# Patient Record
Sex: FEMALE | Race: BLACK OR AFRICAN AMERICAN | HISPANIC OR LATINO | Employment: FULL TIME | URBAN - METROPOLITAN AREA
[De-identification: names, ages, dates, MRNs, and addresses within clinical notes are randomized per-mention and may not be internally consistent; named-entity substitution may affect disease eponyms.]

---

## 2018-01-10 NOTE — MISCELLANEOUS
Provider Comments  Provider Comments:   PT NO SHOW FOR DEPO CALLED PT L/M ALSO SENDING LETTER      Signatures   Electronically signed by : Cyrus Hernandez, ; Apr 7 2016  9:27AM EST                       (Author)    Electronically signed by : BABAK Pollock;  Apr 11 2016  3:57PM EST                       (Acknowledgement)    Electronically signed by : Abel Perry MD; Apr 21 2016  8:39AM EST

## 2018-01-11 NOTE — PROGRESS NOTES
Chief Complaint  Depo today  Active Problems    1  Birth control (V25 9) (Z30 9)   2  Birth control counseling (V25 09) (Z30 9)   3  Encounter for initial prescription of injectable contraceptive (V25 02) (Z30 013)   4  Visit for gynecologic examination (V72 31) (Z01 419)    Current Meds   1  Depo-Provera 150 MG/ML Intramuscular Suspension; Therapy: [id number] to Recorded    Allergies    1  No Known Drug Allergies    2  No Known Environmental Allergies   3  No Known Food Allergies    Vitals  Signs [Data Includes: Current Encounter]    Systolic: 227  Diastolic: 73  Weight: 772 lb     Plan  Birth control    · MedroxyPROGESTERone Acetate 150 MG/ML Intramuscular Suspension    Future Appointments    Date/Time Provider Specialty Site   07/06/2016 03:00 PM Karen Short 171, Injection Schedule  [facility] [place]     Signatures   Electronically signed by :  BABAK Hill; May  2 2016  4:05PM EST                       (Acknowledgement)    Electronically signed by : Radha Anaya MD; May  5 2016  9:28AM EST

## 2018-01-13 NOTE — MISCELLANEOUS
Provider Comments  Provider Comments:   pt no show for depo called pt and also send letter      Signatures   Electronically signed by : Eloise Bustillo, ; Jan 18 2016 10:58AM EST                       (Author)    Electronically signed by :  BABAK Minor; Jan 25 2016  1:43PM EST                       (Acknowledgement)

## 2019-03-21 ENCOUNTER — OFFICE VISIT (OUTPATIENT)
Dept: OBGYN CLINIC | Facility: CLINIC | Age: 24
End: 2019-03-21

## 2019-03-21 VITALS
WEIGHT: 130.2 LBS | SYSTOLIC BLOOD PRESSURE: 129 MMHG | BODY MASS INDEX: 21.69 KG/M2 | HEIGHT: 65 IN | HEART RATE: 75 BPM | DIASTOLIC BLOOD PRESSURE: 83 MMHG

## 2019-03-21 DIAGNOSIS — A64 STD (SEXUALLY TRANSMITTED DISEASE): ICD-10-CM

## 2019-03-21 DIAGNOSIS — Z01.419 ENCOUNTER FOR ANNUAL ROUTINE GYNECOLOGICAL EXAMINATION: Primary | ICD-10-CM

## 2019-03-21 DIAGNOSIS — Z72.51 HIGH RISK HETEROSEXUAL BEHAVIOR: ICD-10-CM

## 2019-03-21 DIAGNOSIS — Z30.42 ENCOUNTER FOR SURVEILLANCE OF INJECTABLE CONTRACEPTIVE: ICD-10-CM

## 2019-03-21 PROCEDURE — S0610 ANNUAL GYNECOLOGICAL EXAMINA: HCPCS | Performed by: NURSE PRACTITIONER

## 2019-03-21 PROCEDURE — 87591 N.GONORRHOEAE DNA AMP PROB: CPT | Performed by: NURSE PRACTITIONER

## 2019-03-21 PROCEDURE — G0143 SCR C/V CYTO,THINLAYER,RESCR: HCPCS | Performed by: NURSE PRACTITIONER

## 2019-03-21 PROCEDURE — 87491 CHLMYD TRACH DNA AMP PROBE: CPT | Performed by: NURSE PRACTITIONER

## 2019-03-21 RX ORDER — MEDROXYPROGESTERONE ACETATE 150 MG/ML
150 INJECTION, SUSPENSION INTRAMUSCULAR
Qty: 1 ML | Refills: 5 | Status: SHIPPED | OUTPATIENT
Start: 2019-03-21

## 2019-03-21 NOTE — PATIENT INSTRUCTIONS
Return in 2 weeks Depoprovera restart  PAP and STD results will be available in 2 weeks  Call with needs or concerns

## 2019-03-21 NOTE — PROGRESS NOTES
Annual Exam    Assessment   1  Encounter for annual routine gynecological examination  Liquid-based pap, screening   2  Encounter for surveillance of injectable contraceptive  medroxyPROGESTERone (DEPO-PROVERA) 150 mg/mL injection   3  High risk heterosexual behavior  Chlamydia/GC amplified DNA by PCR   4  STD (sexually transmitted disease)  Chlamydia/GC amplified DNA by PCR     well woman       Plan       All questions answered  Breast self exam technique reviewed and patient encouraged to perform self-exam monthly  Chlamydia specimen  Contraception: Depo-Provera injections  Discussed healthy lifestyle modifications  Follow up in 1 year  GC specimen  Thin prep Pap smear  Patient Instructions   Return in 2 weeks Depoprovera restart  PAP and STD results will be available in 2 weeks  Call with needs or concerns      Khanh Browne is a 24 y o   female who presents for annual well woman exam  Periods are irregular, lasting on and off since last Depo in October days  No vaginal  Discharge  1 partner x 1 year  Wants Depoprovera restart, last unprotected intercourse 2 days ago    Current contraception: Depo-Provera injections  History of abnormal Pap smear: no  Family history of uterine or ovarian cancer: no  Regular self breast exam: yes  History of abnormal mammogram: N/A  Family history of breast cancer: no  History of abnormal lipids: Unknown  Menstrual History:  OB History        1    Para        Term                AB   1    Living           SAB   1    TAB        Ectopic        Multiple        Live Births                    Menarche age: 14  Patient's last menstrual period was 2019 (exact date)    Period Duration (Days): 3-4 days   Period Pattern: Regular  Menstrual Flow: Light    The following portions of the patient's history were reviewed and updated as appropriate: allergies, current medications, past family history, past medical history, past social history, past surgical history and problem list     Review of Systems  Pertinent items are noted in HPI        Objective      /83   Pulse 75   Ht 5' 5" (1 651 m)   Wt 59 1 kg (130 lb 3 2 oz)   LMP 02/04/2019 (Exact Date)   BMI 21 67 kg/m²     General:   alert and oriented, in no acute distress, alert, appears stated age and cooperative   Heart: regular rate and rhythm, S1, S2 normal, no murmur, click, rub or gallop   Lungs: clear to auscultation bilaterally   Thyroid: Negative masses   Abdomen: soft, non-tender, without masses or organomegaly   Vulva: normal   Vagina: normal mucosa   Cervix: no cervical motion tenderness and no lesions   Uterus: normal size, non-tender, normal shape and consistency   Adnexa: normal adnexa   Breasts: NT, negative masses, discharge or dimpling, bilateral Nipple piercing

## 2019-03-22 LAB
C TRACH DNA SPEC QL NAA+PROBE: NEGATIVE
N GONORRHOEA DNA SPEC QL NAA+PROBE: NEGATIVE

## 2019-03-26 LAB
LAB AP GYN PRIMARY INTERPRETATION: NORMAL
Lab: NORMAL

## 2019-04-03 ENCOUNTER — CLINICAL SUPPORT (OUTPATIENT)
Dept: OBGYN CLINIC | Facility: CLINIC | Age: 24
End: 2019-04-03

## 2019-04-03 DIAGNOSIS — Z30.42 ENCOUNTER FOR SURVEILLANCE OF INJECTABLE CONTRACEPTIVE: Primary | ICD-10-CM

## 2019-04-03 LAB — SL AMB POCT URINE HCG: NEGATIVE

## 2019-04-03 PROCEDURE — 81025 URINE PREGNANCY TEST: CPT

## 2019-04-03 PROCEDURE — 96372 THER/PROPH/DIAG INJ SC/IM: CPT

## 2019-04-03 RX ORDER — MEDROXYPROGESTERONE ACETATE 150 MG/ML
150 INJECTION, SUSPENSION INTRAMUSCULAR ONCE
Status: COMPLETED | OUTPATIENT
Start: 2019-04-03 | End: 2019-04-03

## 2019-04-03 RX ADMIN — MEDROXYPROGESTERONE ACETATE 150 MG: 150 INJECTION, SUSPENSION INTRAMUSCULAR at 14:00

## 2019-06-24 ENCOUNTER — CLINICAL SUPPORT (OUTPATIENT)
Dept: OBGYN CLINIC | Facility: CLINIC | Age: 24
End: 2019-06-24

## 2019-06-24 DIAGNOSIS — Z30.42 ENCOUNTER FOR SURVEILLANCE OF INJECTABLE CONTRACEPTIVE: Primary | ICD-10-CM

## 2019-06-24 LAB — SL AMB POCT URINE HCG: NEGATIVE

## 2019-06-24 PROCEDURE — 81025 URINE PREGNANCY TEST: CPT

## 2019-06-24 PROCEDURE — 96372 THER/PROPH/DIAG INJ SC/IM: CPT

## 2019-06-24 RX ORDER — MEDROXYPROGESTERONE ACETATE 150 MG/ML
150 INJECTION, SUSPENSION INTRAMUSCULAR ONCE
Status: COMPLETED | OUTPATIENT
Start: 2019-06-24 | End: 2019-06-24

## 2019-06-24 RX ADMIN — MEDROXYPROGESTERONE ACETATE 150 MG: 150 INJECTION, SUSPENSION INTRAMUSCULAR at 13:20

## 2019-09-17 ENCOUNTER — OFFICE VISIT (OUTPATIENT)
Dept: OBGYN CLINIC | Facility: CLINIC | Age: 24
End: 2019-09-17

## 2019-09-17 VITALS
BODY MASS INDEX: 23.66 KG/M2 | HEART RATE: 87 BPM | DIASTOLIC BLOOD PRESSURE: 80 MMHG | WEIGHT: 142.2 LBS | SYSTOLIC BLOOD PRESSURE: 120 MMHG

## 2019-09-17 DIAGNOSIS — N93.9 ABNORMAL UTERINE BLEEDING (AUB): ICD-10-CM

## 2019-09-17 DIAGNOSIS — Z72.51 HIGH RISK HETEROSEXUAL BEHAVIOR: ICD-10-CM

## 2019-09-17 DIAGNOSIS — Z30.42 ENCOUNTER FOR SURVEILLANCE OF INJECTABLE CONTRACEPTIVE: Primary | ICD-10-CM

## 2019-09-17 LAB — SL AMB POCT URINE HCG: NEGATIVE

## 2019-09-17 PROCEDURE — 96372 THER/PROPH/DIAG INJ SC/IM: CPT | Performed by: OBSTETRICS & GYNECOLOGY

## 2019-09-17 PROCEDURE — 81025 URINE PREGNANCY TEST: CPT | Performed by: OBSTETRICS & GYNECOLOGY

## 2019-09-17 PROCEDURE — 99213 OFFICE O/P EST LOW 20 MIN: CPT | Performed by: OBSTETRICS & GYNECOLOGY

## 2019-09-17 RX ORDER — MEDROXYPROGESTERONE ACETATE 150 MG/ML
150 INJECTION, SUSPENSION INTRAMUSCULAR ONCE
Status: CANCELLED | OUTPATIENT
Start: 2019-09-17 | End: 2019-09-17

## 2019-09-17 RX ORDER — MEDROXYPROGESTERONE ACETATE 150 MG/ML
150 INJECTION, SUSPENSION INTRAMUSCULAR ONCE
Status: COMPLETED | OUTPATIENT
Start: 2019-09-17 | End: 2019-09-17

## 2019-09-17 RX ADMIN — MEDROXYPROGESTERONE ACETATE 150 MG: 150 INJECTION, SUSPENSION INTRAMUSCULAR at 12:40

## 2019-09-17 NOTE — PROGRESS NOTES
Depo-Provera      Patient provided box   Mee Reg Last Depo date: 6/24   Side effects:  None   HCG: Negative   Given by: BOBO De La Rosa   Site: Right buttock   Calcium supplement daily teaching, condoms for 2 weeks following first injection dose

## 2019-09-17 NOTE — PROGRESS NOTES
ASSESMENT & PLAN:           1  AUB   - F/u TVUS, CBC, TSH, prolactin   - Discussed pending results, pt may benefit from oral hormonal medication ie Micronor on top of Depoprovera medication to stabilize the endometrium  Also reviewed that she may be a candidate for sonohysterogram if TVUS imaging is suspicious for any submucosal abnormalities   - Continue Depot-Provera injection per pt request for contraception, pt aware that this method can cause irregular spotting   - Reviewed LARCs and alternative anticonceptive medications, pt declined IUD, Nexplanon  2  RTC   - 2 weeks after completing above labwork/imaging    SUBJECTIVE:             Patient is a 24 y o  at [place] [address] who presents for AUB and depoprovera injection today  Of note, the patient reports that she has daily spotting that is not heavy, but interferes with her life  She has to go through several pantyliners a day and tampons when the bleeding becomes heavier (generally in AM once she sits up)  She reports that this daily spotting started approximately 9 months ago, but most notably before the last 3 Depoprovera injections  She reports she took the Depoprovera in an effort to regulate her period and she does not believe it is helping  However, she enjoys the Depoprovera as a form of birth control and states it is the best option for her at this time  She reports she would forget to take pills and does not want the IUD or Nexplanon as it is "too invasive"  She states she is aware that the Nexplanon is a good option as her sister has it in place  Previously, she had [facility], albeit sporadically  She is sexually active with the same male partner since her lats visit  She had negative STD testing and declined STD testing at today's visit      Review of Systems   Review of Systems    Gyn History:  Daily light spotting  Patient is currently sexually active, heterosexual  History of sexually transmitted infection: Yes   History of abnormal pap smears:  No     Birth control: depoprovera    OB Hx:  OB History    Para Term  AB Living   1 0 0 0 1 0   SAB TAB Ectopic Multiple Live Births   1 0 0 0 0      # Outcome Date GA Lbr Jaylon/2nd Weight Sex Delivery Anes PTL Lv   1 2012             Medical Hx  History reviewed  No pertinent past medical history  Surgical Hx  History reviewed  No pertinent surgical history    Family Hx  Family History   Problem Relation Age of Onset    No Known Problems Mother     No Known Problems Father     No Known Problems Brother     No Known Problems Brother     No Known Problems Brother      Social Hx  Social History     Socioeconomic History    Marital status: Single     Spouse name: Not on file    Number of children: Not on file    Years of education: Not on file    Highest education level: Not on file   Occupational History    Not on file   Social Needs    Financial resource strain: Not on file    Food insecurity:     Worry: Not on file     Inability: Not on file    Transportation needs:     Medical: Not on file     Non-medical: Not on file   Tobacco Use    Smoking status: Never Smoker    Smokeless tobacco: Current User    Tobacco comment: hookah   Substance and Sexual Activity    Alcohol use: Yes     Frequency: 2-4 times a month    Drug use: No    Sexual activity: Yes     Partners: Male   Lifestyle    Physical activity:     Days per week: Not on file     Minutes per session: Not on file    Stress: Not on file   Relationships    Social connections:     Talks on phone: Not on file     Gets together: Not on file     Attends Restoration service: Not on file     Active member of club or organization: Not on file     Attends meetings of clubs or organizations: Not on file     Relationship status: Not on file    Intimate partner violence:     Fear of current or ex partner: Not on file     Emotionally abused: Not on file     Physically abused: Not on file     Forced sexual activity: Not on file   Other Topics Concern    Not on file   Social History Narrative    Not on file     Allergies  No Known Allergies  Meds    Current Outpatient Medications:     medroxyPROGESTERone (DEPO-PROVERA) 150 mg/mL injection, Inject 1 mL (150 mg total) into a muscle every 3 (three) months, Disp: 1 mL, Rfl: 5    Prenatal Multivit-Min-Fe-FA (PRENATAL VITAMINS PO), Take by mouth, Disp: , Rfl:     OBJECTIVE:               Vitals  Vitals:    09/17/19 0957   BP: 120/80   Pulse: 87       Physical Exam  -Deferred, pt is in NAD, pleasant, conversant, she declined a pelvic exam today    Athens Landon, DO  PGY-3 OB/GYN   9/17/2019 10:28 AM

## 2019-10-30 ENCOUNTER — TRANSCRIBE ORDERS (OUTPATIENT)
Dept: ADMINISTRATIVE | Facility: HOSPITAL | Age: 24
End: 2019-10-30

## 2019-11-01 ENCOUNTER — HOSPITAL ENCOUNTER (OUTPATIENT)
Dept: ULTRASOUND IMAGING | Facility: HOSPITAL | Age: 24
Discharge: HOME | End: 2019-11-01
Payer: COMMERCIAL

## 2019-11-01 DIAGNOSIS — N93.9 ABNORMAL UTERINE BLEEDING (AUB): ICD-10-CM

## 2019-11-01 PROCEDURE — 76830 TRANSVAGINAL US NON-OB: CPT

## 2019-11-01 PROCEDURE — 76856 US EXAM PELVIC COMPLETE: CPT

## 2019-11-06 ENCOUNTER — OFFICE VISIT (OUTPATIENT)
Dept: OBGYN CLINIC | Facility: CLINIC | Age: 24
End: 2019-11-06

## 2019-11-06 VITALS
WEIGHT: 152 LBS | BODY MASS INDEX: 25.29 KG/M2 | HEART RATE: 88 BPM | SYSTOLIC BLOOD PRESSURE: 117 MMHG | DIASTOLIC BLOOD PRESSURE: 75 MMHG

## 2019-11-06 DIAGNOSIS — N93.9 ABNORMAL UTERINE BLEEDING: Primary | ICD-10-CM

## 2019-11-06 PROCEDURE — 99213 OFFICE O/P EST LOW 20 MIN: CPT | Performed by: OBSTETRICS & GYNECOLOGY

## 2019-11-06 NOTE — PROGRESS NOTES
Assessment:  24 y o  [place] who presents as a follow up for TVUS result    Plan:  Discussed with patient that her ultrasound shows a thin lining and that we can stabilize her lining with estradiol  Also discussed the ovaries are likely an incidental finding at this time since we are not concerned for infertility and she has been on the depo-provera for an extended time  Patient declined estradiol and would prefer to be off of the depo-provera at this time  Discussed that patient can get pregnant and that it may be a year before her menses self-regulates itself  Patient expresses understanding  All questions answered  Usman Rasmussen  11/6/2019    __________________________________________________________________    Subjective   Cam Black is a 24 y o  [place] who presents as a follow up for TVUS result    In summary, patient reports she was previously on depo-provera and did not have bleeding issue  She then went off of the depo-provera for some time before being restarted on it again in 3/2019 for irregular menses  However, patient reports despite 8 months of being on the depo-provera, she is still having spotting daily  AT this time patient is requesting to be off of the depo-provera  The following portions of the patient's history were reviewed and updated as appropriate: allergies, current medications, past family history, past medical history, past social history, past surgical history and problem list     Review of Systems  Pertinent items are noted in HPI  Objective  Vitals:    11/06/19 1326   BP: 117/75   Pulse: 88       Physical Exam:  General:   alert and oriented, in no acute distress     Imaging  PELVIC ULTRASOUND, COMPLETE     INDICATION:  24years old  N93 9: Abnormal uterine and vaginal bleeding, unspecified      COMPARISON: None     TECHNIQUE:   Transabdominal pelvic ultrasound was performed in sagittal and transverse planes with a curvilinear transducer    Additional transvaginal imaging was performed to better evaluate the endometrium and ovaries  Imaging included volumetric   sweeps as well as traditional still imaging technique      FINDINGS:     UTERUS:  The uterus is anteverted in position, measuring 8 7 x 2 9 x 5 1 cm  Nonspecific diffusely heterogeneous uterine echotexture  The cervix shows no suspicious abnormality      ENDOMETRIUM:    Normal caliber of 1 4 mm  Homogeneous and normal in appearance      OVARIES/ADNEXA:  Right ovary is top normal size, 19 cc  Numerous peripherally oriented follicles  Doppler flow within normal limits      Left ovary is normal size, 8 cc  Numerous peripherally oriented follicles  Doppler flow within normal limits      No suspicious adnexal mass or loculated collections    There is no free fluid      IMPRESSION:     Prominent right ovarian size with peripherally oriented follicles, correlate for polycystic ovaries if clinically suspected